# Patient Record
Sex: MALE | Race: WHITE | NOT HISPANIC OR LATINO | Employment: OTHER | URBAN - METROPOLITAN AREA
[De-identification: names, ages, dates, MRNs, and addresses within clinical notes are randomized per-mention and may not be internally consistent; named-entity substitution may affect disease eponyms.]

---

## 2022-07-27 ENCOUNTER — OFFICE VISIT (OUTPATIENT)
Dept: URGENT CARE | Facility: URGENT CARE | Age: 72
End: 2022-07-27
Payer: MEDICARE

## 2022-07-27 VITALS
DIASTOLIC BLOOD PRESSURE: 92 MMHG | HEART RATE: 79 BPM | TEMPERATURE: 97.3 F | SYSTOLIC BLOOD PRESSURE: 147 MMHG | WEIGHT: 245 LBS | RESPIRATION RATE: 16 BRPM | OXYGEN SATURATION: 97 %

## 2022-07-27 DIAGNOSIS — T14.8XXA OPEN WOUND: Primary | ICD-10-CM

## 2022-07-27 LAB — WBC # BLD AUTO: 6 10E3/UL (ref 4–11)

## 2022-07-27 PROCEDURE — 36415 COLL VENOUS BLD VENIPUNCTURE: CPT | Performed by: PHYSICIAN ASSISTANT

## 2022-07-27 PROCEDURE — 85048 AUTOMATED LEUKOCYTE COUNT: CPT | Performed by: PHYSICIAN ASSISTANT

## 2022-07-27 PROCEDURE — 99203 OFFICE O/P NEW LOW 30 MIN: CPT | Performed by: PHYSICIAN ASSISTANT

## 2022-07-27 RX ORDER — DOXYCYCLINE 100 MG/1
CAPSULE ORAL
COMMUNITY

## 2022-07-27 RX ORDER — INSULIN GLARGINE 100 [IU]/ML
INJECTION, SOLUTION SUBCUTANEOUS
COMMUNITY

## 2022-07-27 RX ORDER — DICYCLOMINE HCL 20 MG
TABLET ORAL
COMMUNITY

## 2022-07-27 RX ORDER — LORATADINE 10 MG/1
10 TABLET ORAL
COMMUNITY

## 2022-07-27 RX ORDER — TRIAMCINOLONE ACETONIDE 1 MG/G
CREAM TOPICAL
COMMUNITY

## 2022-07-27 RX ORDER — MULTIPLE VITAMINS W/ MINERALS TAB 9MG-400MCG
1 TAB ORAL DAILY
COMMUNITY

## 2022-07-27 RX ORDER — PIOGLITAZONEHYDROCHLORIDE 15 MG/1
TABLET ORAL
COMMUNITY

## 2022-07-27 RX ORDER — NITROGLYCERIN 0.3 MG/1
0.3 TABLET SUBLINGUAL
COMMUNITY

## 2022-07-27 RX ORDER — METOPROLOL SUCCINATE 25 MG/1
TABLET, EXTENDED RELEASE ORAL
COMMUNITY
Start: 2021-10-01

## 2022-07-27 RX ORDER — DULAGLUTIDE 0.75 MG/.5ML
INJECTION, SOLUTION SUBCUTANEOUS
COMMUNITY
Start: 2020-11-20

## 2022-07-27 RX ORDER — ROSUVASTATIN CALCIUM 40 MG/1
TABLET, COATED ORAL
COMMUNITY

## 2022-07-27 RX ORDER — DAPAGLIFLOZIN 10 MG/1
TABLET, FILM COATED ORAL
COMMUNITY

## 2022-07-27 RX ORDER — CLOPIDOGREL BISULFATE 75 MG/1
TABLET ORAL
COMMUNITY

## 2022-07-27 RX ORDER — ASPIRIN 81 MG/1
TABLET, CHEWABLE ORAL
COMMUNITY
Start: 2021-05-12

## 2022-07-27 RX ORDER — FUROSEMIDE 40 MG
TABLET ORAL
COMMUNITY
Start: 2020-10-28

## 2022-07-27 RX ORDER — FERROUS SULFATE 325(65) MG
TABLET ORAL
COMMUNITY

## 2022-07-27 RX ORDER — INSULIN LISPRO 100 [IU]/ML
INJECTION, SOLUTION INTRAVENOUS; SUBCUTANEOUS
COMMUNITY
Start: 2022-05-26

## 2022-07-27 RX ORDER — PEN NEEDLE, DIABETIC 31 GX5/16"
NEEDLE, DISPOSABLE MISCELLANEOUS
COMMUNITY
Start: 2022-06-17

## 2022-07-27 RX ORDER — AMIODARONE HYDROCHLORIDE 200 MG/1
TABLET ORAL
COMMUNITY

## 2022-07-27 RX ORDER — LISINOPRIL 2.5 MG/1
TABLET ORAL EVERY 24 HOURS
COMMUNITY

## 2022-07-27 RX ORDER — CEPHALEXIN 750 MG/1
CAPSULE ORAL
COMMUNITY

## 2022-07-27 RX ORDER — SWAB
SWAB, NON-MEDICATED MISCELLANEOUS
COMMUNITY

## 2022-08-12 NOTE — PROGRESS NOTES
Pt has a sore on right lower leg that has     gotten more red-1 week (Pt had a wound like this in the past and was hospitalized)     SUBJECTIVE:  Dylan Stone is a 72 year old male who presents to the clinic today for a red sore on right LL.  Onset of rash was 1 week(s) ago.   Seems to be getting bigger  Previous history of a similar rash? Yes: cellulitis  Recent exposure history: none known    Associated symptoms include: nothing.    No past medical history on file.  Current Outpatient Medications   Medication Sig Dispense Refill     amiodarone (PACERONE) 200 MG tablet amiodarone 200 mg tablet       aspirin (ASA) 81 MG chewable tablet aspirin 81 mg chewable tablet   TAKE ONE (1) TABLET(S) BY MOUTH DAILY.       B-D U/F 31G X 8 MM insulin pen needle USE AS DIRECTED FOUR TIMES A DAY.       clopidogrel (PLAVIX) 75 MG tablet clopidogrel 75 mg tablet       dapagliflozin (FARXIGA) 10 MG TABS tablet Farxiga 10 mg tablet       dicyclomine (BENTYL) 20 MG tablet dicyclomine 20 mg tablet   TAKE ONE (1) TABLET (20 MG TOTAL) BY MOUTH EVERY 6 (SIX) HOURS AS NEEDED (DIARRHEA.).       dulaglutide (TRULICITY) 0.75 MG/0.5ML pen Trulicity 0.75 mg/0.5 mL subcutaneous pen injector       ferrous sulfate (FEROSUL) 325 (65 Fe) MG tablet FeroSul 325 mg (65 mg iron) tablet   TAKE ONE (1) TABLET BY MOUTH 2 (TWO) TIMES DAILY.       Flax Oil-Fish Oil-Borage Oil CAPS        furosemide (LASIX) 40 MG tablet furosemide 40 mg tablet   TAKE ONE (1) TABLET(S) BY MOUTH DAILY.       HUMALOG KWIKPEN 100 UNIT/ML soln USE UP TO 25 UNITS SUBCUTANEOUSLY THREE TIMES A DAY.       insulin glargine (LANTUS SOLOSTAR) 100 UNIT/ML pen Lantus Solostar U-100 Insulin 100 unit/mL (3 mL) subcutaneous pen   INJECT UP  UNITS SUBCUTANEOUSLY ONCE A DAY.       Lactobacillus Rhamnosus, GG, ( PROBIOTIC DIGESTIVE CARE) CAPS Take 1 capsule by mouth daily       lisinopril (ZESTRIL) 2.5 MG tablet every 24 hours       loratadine (CLARITIN) 10 MG tablet Take 10 mg by mouth        melatonin 3 MG CAPS melatonin   6mg 1 tablet PO DAILY       metFORMIN (GLUCOPHAGE) 1000 MG tablet metformin 1,000 mg tablet       metoprolol succinate ER (TOPROL XL) 25 MG 24 hr tablet metoprolol succinate ER 25 mg tablet,extended release 24 hr   TAKE 1 TABLET (25 MG TOTAL) BY MOUTH DAILY       multivitamin w/minerals (THERA-VIT-M) tablet Take 1 tablet by mouth daily       nitroGLYcerin (NITROSTAT) 0.3 MG sublingual tablet Place 0.3 mg under the tongue       pioglitazone (ACTOS) 15 MG tablet pioglitazone 15 mg tablet       rosuvastatin (CRESTOR) 40 MG tablet rosuvastatin 40 mg tablet   TAKE ONE (1) TABLET(S) BY MOUTH AT BEDTIME.       triamcinolone (KENALOG) 0.1 % external cream triamcinolone acetonide 0.1 % topical cream   APPLY A THIN LAYER TO THE AFFECTED AREA(S) ONCE A DAY.       cephALEXin (KEFLEX) 750 MG capsule cephalexin 750 mg capsule   TAKE ONE (1) CAPSULE(S) BY MOUTH TWICE A DAY. (Patient not taking: Reported on 7/27/2022)               doxycycline monohydrate (MONODOX) 100 MG capsule doxycycline monohydrate 100 mg capsule (Patient not taking: Reported on 7/27/2022)       Social History     Tobacco Use     Smoking status: Never Smoker     Smokeless tobacco: Never Used   Substance Use Topics     Alcohol use: Not on file       ROS:  Review of systems negative except as stated above.    EXAM:   BP (!) 147/92   Pulse 79   Temp 97.3  F (36.3  C) (Tympanic)   Resp 16   Wt 111.1 kg (245 lb)   SpO2 97%   GENERAL: alert, no acute distress.  SKIN: redness extending from flap laceration  GENERAL APPEARANCE: healthy, alert and no distress  RESP: lungs clear to auscultation - no rales, rhonchi or wheezes  CV: regular rates and rhythm, normal S1 S2, no murmur noted  NEURO: Normal strength and tone, sensory exam grossly normal,  normal speech and mentation      Results for orders placed or performed in visit on 07/27/22   WBC count     Status: Normal   Result Value Ref Range    WBC Count 6.0 4.0 - 11.0 10e3/uL        ASSESSMENT:  (T14.8XXA) Open wound  (primary encounter diagnosis)  Plan: WBC count, amoxicillin-clavulanate (AUGMENTIN)         875-125 MG tablet      Red flags and emergent follow up discussed, and understood by patient  Follow up with PCP in 2-3 days    There are no Patient Instructions on file for this visit.

## 2024-11-27 ENCOUNTER — OFFICE VISIT (OUTPATIENT)
Dept: URGENT CARE | Facility: URGENT CARE | Age: 74
End: 2024-11-27
Payer: COMMERCIAL

## 2024-11-27 ENCOUNTER — TELEPHONE (OUTPATIENT)
Dept: INTERNAL MEDICINE | Facility: CLINIC | Age: 74
End: 2024-11-27

## 2024-11-27 VITALS
RESPIRATION RATE: 16 BRPM | TEMPERATURE: 97.7 F | DIASTOLIC BLOOD PRESSURE: 66 MMHG | OXYGEN SATURATION: 99 % | HEART RATE: 78 BPM | SYSTOLIC BLOOD PRESSURE: 100 MMHG

## 2024-11-27 DIAGNOSIS — Z48.00 ENCOUNTER FOR CHANGE OF DRESSING: Primary | ICD-10-CM

## 2024-11-27 DIAGNOSIS — Z98.890 S/P FOOT SURGERY: ICD-10-CM

## 2024-11-27 DIAGNOSIS — E10.628 TYPE 1 DIABETES MELLITUS WITH OTHER SKIN COMPLICATION (H): ICD-10-CM

## 2024-11-27 PROCEDURE — 99213 OFFICE O/P EST LOW 20 MIN: CPT | Performed by: FAMILY MEDICINE

## 2024-11-27 NOTE — TELEPHONE ENCOUNTER
Patient contacts clinic stating that he is visiting from [place]. Patient inquires if urgent care has specific wound dressings available to provide patient with dressing change. Writer informed patient that urgent care does have the majority of the supplies that patient is requesting.

## 2024-11-27 NOTE — PROGRESS NOTES
SUBJECTIVE: Dylan Stone is a 74 year old male presenting with a chief complaint of needing dressing changes bilaterwal lower ext.    No past medical history on file.  No Known Allergies  Social History     Tobacco Use    Smoking status: Never    Smokeless tobacco: Never   Substance Use Topics    Alcohol use: Not on file       ROS:  SKIN: no rash  GI: no vomiting    OBJECTIVE:  /66 (BP Location: Right arm, Patient Position: Sitting, Cuff Size: Adult Regular)   Pulse 78   Temp 97.7  F (36.5  C) (Tympanic)   Resp 16   SpO2 99% GENERAL APPEARANCE: healthy, alert and no distress  SKIN: several wound lower ext melinda, dressing changed      ICD-10-CM    1. Encounter for change of dressing  Z48.00       2. S/P foot surgery  Z98.890       3. 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